# Patient Record
Sex: FEMALE | Race: WHITE | Employment: STUDENT | URBAN - NONMETROPOLITAN AREA
[De-identification: names, ages, dates, MRNs, and addresses within clinical notes are randomized per-mention and may not be internally consistent; named-entity substitution may affect disease eponyms.]

---
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
## 2020-05-19 ENCOUNTER — TELEPHONE (OUTPATIENT)
Dept: PRIMARY CARE CLINIC | Age: 20
End: 2020-05-19

## 2020-05-20 NOTE — TELEPHONE ENCOUNTER
That is more than likely ovarian discomfort.   However if she wants to proceed with a transvaginal pelvic/ovarian ultrasound we can order due to increased abdominal pain/cramping with menstruation